# Patient Record
Sex: MALE | Race: WHITE | NOT HISPANIC OR LATINO | URBAN - NONMETROPOLITAN AREA
[De-identification: names, ages, dates, MRNs, and addresses within clinical notes are randomized per-mention and may not be internally consistent; named-entity substitution may affect disease eponyms.]

---

## 2018-12-04 NOTE — PATIENT DISCUSSION
Monitor for changes. Advised patient to call our office with any decreased vision or any increase in flashes and/or floaters.

## 2018-12-04 NOTE — PATIENT DISCUSSION
Monitor for changes. Advised patient of condition of cataract OS and discussed symptoms of cataract worsening and becoming more bothersome.  Pt to call if vision changes or becomes more bothered by visual symptoms before next appt.

## 2019-05-29 ENCOUNTER — IMPORTED ENCOUNTER (OUTPATIENT)
Dept: URBAN - NONMETROPOLITAN AREA CLINIC 1 | Facility: CLINIC | Age: 69
End: 2019-05-29

## 2019-05-29 PROBLEM — H52.4: Noted: 2019-05-29

## 2019-05-29 PROBLEM — E11.9: Noted: 2019-05-29

## 2019-05-29 PROBLEM — H31.092: Noted: 2019-05-29

## 2019-05-29 PROBLEM — H25.813: Noted: 2019-05-29

## 2019-05-29 PROCEDURE — 92015 DETERMINE REFRACTIVE STATE: CPT

## 2019-05-29 PROCEDURE — 92014 COMPRE OPH EXAM EST PT 1/>: CPT

## 2019-05-29 NOTE — PATIENT DISCUSSION
Myopia / Astigmatism / Presbyopia OU- Discussed diagnosis in detail with patient - Recommend holding off on glasses Rx at this time due to cataracts progressing/ having cataract eval with Dr. Lashae Gilliam- Continue to monitor- RTC 1 year complete  NIDDM (2004)- Discussed diagnosis in detail with patient- Stressed importance of good blood sugar control- Recommend no soda’s- History of diabetic retinopathy in the past but no BDR seen on todays dilated exam.- Patient reports last A1C was 6.5 and does not check blood sugar daily- Letter to Dr. Jamal Fischer in [address] to [address] [facility]- Discussed diagnosis in detail with patient- Discussed signs and symptoms of progression- Discussed UV protection- Recommend cataract eval with Dr. Lashae Gilliam patient agrees with plan but would like to wait on the evaluation until after August - Continue to monitor CR Scar OS- Discussed diagnosis in detail with patient- No changes at this time- Continue to monitor PRN; Dr's Notes: PA is Alessandra Fuentes in [place]  at Dr. Cherie Webster office MR  5/29/19DFE  5/29/19Optos  5/1/17

## 2019-07-31 ENCOUNTER — IMPORTED ENCOUNTER (OUTPATIENT)
Dept: URBAN - NONMETROPOLITAN AREA CLINIC 1 | Facility: CLINIC | Age: 69
End: 2019-07-31

## 2019-07-31 PROCEDURE — 92014 COMPRE OPH EXAM EST PT 1/>: CPT

## 2019-07-31 NOTE — PATIENT DISCUSSION
CR Scar OS- Discussed diagnosis in detail with patient- No changes at this time- Continue to monitor PRN DM s DR-Stressed the importance of keeping blood sugars under control blood pressure under control and weight normalization and regular visits with PCP. -Explained the possible effects of poorly controlled diabetes and the damage that diabetes can cause to ocular health. -Patient to check HgbA1C.-Pt instructed to contact our office with any vision changes. Cataract(s)-Visually significant cataract OU .-Cataract(s) causing symptomatic impairment of visual function not correctable with a tolerable change in glasses or contact lenses lighting or non-operative means resulting in specific activity limitations and/or participation restrictions including but not limited to reading viewing television driving or meeting vocational or recreational needs. -Expectation is clearer vision and functional improvement in symptoms as well as reduced glare disability after cataract removal.-Order IOLMaster and OPD today. -Recommend Standard/Traditional based on today's OPD testing and lifestyle questionnaire.-All questions were answered regarding surgery including pre and post-op medications appointments activity restrictions and anesthetic usage.-The risks benefits and alternatives and special risk factors for the patient were discussed in detail including but not limited to: bleeding infection retinal detachment vitreous loss problems with the implant and possible need for additional surgery.-Although rare the possibility of complete vision loss was discussed.-The possible need for glasses post-operatively was discussed.-Order medical clearance exam based on history of diabetes-Patient elects to proceed with cataract surgery OD .  Will schedule at patient's convenience and re-evaluate OS  in the future.; Dr's Notes: JOSE CARLOS Sanchez in [place]  at Dr. Theresa Taylor office MR  5/29/19DFE  5/29/19Optos  5/1/17

## 2019-08-03 PROBLEM — E11.9: Noted: 2019-08-03

## 2019-08-03 PROBLEM — H31.092: Noted: 2019-05-29

## 2019-08-03 PROBLEM — H52.4: Noted: 2019-08-03

## 2019-08-03 PROBLEM — H25.813: Noted: 2019-08-03

## 2019-09-10 ENCOUNTER — IMPORTED ENCOUNTER (OUTPATIENT)
Dept: URBAN - NONMETROPOLITAN AREA CLINIC 1 | Facility: CLINIC | Age: 69
End: 2019-09-10

## 2019-09-10 PROBLEM — Z01.818: Noted: 2019-09-10

## 2019-09-10 PROBLEM — I10: Noted: 2019-09-10

## 2019-09-10 PROBLEM — E78.5: Noted: 2019-09-10

## 2019-09-10 PROBLEM — E11.9: Noted: 2019-05-29

## 2019-09-10 NOTE — PATIENT DISCUSSION
No outstanding medical concerns and patient has been cleared for surgery; Dr's Notes: PA is Fredis Benoit in [place]  at Dr. Briana Lion <br />[facility] office <br />MR  5/29/19<br />DFE  5/29/19<br />Optos  5/1/17<br />

## 2019-09-17 ENCOUNTER — IMPORTED ENCOUNTER (OUTPATIENT)
Dept: URBAN - NONMETROPOLITAN AREA CLINIC 1 | Facility: CLINIC | Age: 69
End: 2019-09-17

## 2019-09-17 PROCEDURE — 99024 POSTOP FOLLOW-UP VISIT: CPT

## 2019-09-17 PROCEDURE — 66984 XCAPSL CTRC RMVL W/O ECP: CPT

## 2019-09-17 PROCEDURE — 92136 OPHTHALMIC BIOMETRY: CPT

## 2019-09-17 NOTE — PATIENT DISCUSSION
Cataract(s)-Visually significant cataract OS . -Cataract(s) causing symptomatic impairment of visual function not correctable with a tolerable change in glasses or contact lenses lighting or non-operative means resulting in specific activity limitations and/or participation restrictions including but not limited to reading viewing television driving or meeting vocational or recreational needs. -Expectation is clearer vision and functional improvement in symptoms as well as reduced glare disability after cataract removal.-Recommend Standard/Traditional based on previous OPD testing and lifestyle questionnaire.-All questions were answered regarding surgery including pre and post-op medications appointments activity restrictions and anesthetic usage.-The risks benefits and alternatives and special risk factors for the patient were discussed in detail including but not limited to: bleeding infection retinal detachment vitreous loss problems with the implant and possible need for additional surgery.-Although rare the possibility of complete vision loss was discussed.-The need for glasses post-operatively was discussed.-Patient elects to proceed with cataract surgery OS . Will schedule at patient's convenience. s/p PCIOL-Pt doing well s/p PCIOL. -Continue post-op gtts according to instruction sheet and sleep with eye shield over eye for 7 nights.-Avoid bending at the waist lifting anything over 5lbs and dirty or danilo environments.; 's Notes: JOSE CARLOS Whatley in [place]  at Dr. Idalia Rodriguez office MR  5/29/19DFE  5/29/19Optos  5/1/17

## 2019-09-18 PROBLEM — E11.9: Noted: 2019-11-12

## 2019-09-18 PROBLEM — Z98.41: Noted: 2019-09-18

## 2019-09-18 PROBLEM — E11.9: Noted: 2019-09-18

## 2019-09-18 PROBLEM — Z98.42: Noted: 2019-10-09

## 2019-09-18 PROBLEM — H25.12: Noted: 2019-09-18

## 2019-09-18 PROBLEM — H26.493: Noted: 2019-11-12

## 2019-09-24 ENCOUNTER — IMPORTED ENCOUNTER (OUTPATIENT)
Dept: URBAN - NONMETROPOLITAN AREA CLINIC 1 | Facility: CLINIC | Age: 69
End: 2019-09-24

## 2019-09-24 PROCEDURE — 99024 POSTOP FOLLOW-UP VISIT: CPT

## 2019-09-24 NOTE — PATIENT DISCUSSION
1 Week POV CE OD 9/17/19 Standard-  The pt has undergone successful cataract extraction with Intraocular lens implantation in the right eye.-  PO examination is normal and visual acuity has improved. -  The pt has been instructed to call the office immediately if there is increased redness pain or vision loss. -  Ocular meds plan discussed and patient received printed take home instructions.- RTC 3 weeks; Dr's Notes: PA is Issa Means in [place]  at Dr. Duque Born office MR  5/29/19DFE  5/29/19Optos  5/1/17

## 2019-10-09 ENCOUNTER — IMPORTED ENCOUNTER (OUTPATIENT)
Dept: URBAN - NONMETROPOLITAN AREA CLINIC 1 | Facility: CLINIC | Age: 69
End: 2019-10-09

## 2019-10-09 PROCEDURE — 99024 POSTOP FOLLOW-UP VISIT: CPT

## 2019-10-09 NOTE — PATIENT DISCUSSION
1 day POV CE OS - The pt has undergone successful cataract extraction with Intraocular lens implantation in the left eye.- PO examination is normal and visual acuity has improved. - Instructed patient not to rub the eye and don't go swimming. - Pt should return in 1 week for follow up.  - Ocular meds plan discussed and patient received printed take home instructions.; Dr's Notes: PA is Joe Baez in [place]  at Dr. Nicole Clay office MR  5/29/19DFE  5/29/19Optos  5/1/17

## 2019-10-15 ENCOUNTER — IMPORTED ENCOUNTER (OUTPATIENT)
Dept: URBAN - NONMETROPOLITAN AREA CLINIC 1 | Facility: CLINIC | Age: 69
End: 2019-10-15

## 2019-10-15 PROCEDURE — 99024 POSTOP FOLLOW-UP VISIT: CPT

## 2019-10-15 NOTE — PATIENT DISCUSSION
1 Week POV CE OS 10/8/19 CE OD 9/17/19 (Standard IOL OU) - Discussed diagnosis in detail with patient- Patient is stable and doing well- Wound intact- Continue all post op drops as directed- Continue to monitor- RTC 3 weeks POV MR; 's Notes: PA is Cleo Espana in [place]  at Dr. Kathie Byers office MR  5/29/19DFE  5/29/19Optos  5/1/17

## 2019-11-12 ENCOUNTER — IMPORTED ENCOUNTER (OUTPATIENT)
Dept: URBAN - NONMETROPOLITAN AREA CLINIC 1 | Facility: CLINIC | Age: 69
End: 2019-11-12

## 2019-11-12 PROCEDURE — 92015 DETERMINE REFRACTIVE STATE: CPT

## 2019-11-12 PROCEDURE — 99024 POSTOP FOLLOW-UP VISIT: CPT

## 2019-11-12 NOTE — PATIENT DISCUSSION
3 Week POV CE OS 10/8/19 CE OD 9/17/19 (Standard IOL OU) - Discussed diagnosis in detail with patient- Patient is stable and doing well- Wound intact- MR done today and new glasses Rx given - PCO OU noted but stable and no treatment needed at this time - Continue to monitor- RTC 3 months Pseudophakia OU; Dr's Notes: PA is Puma Castro in [place]  at Dr. Moira Coleman office MR  5/29/19DFE  5/29/19Optos  5/1/17

## 2019-12-11 NOTE — PATIENT DISCUSSION
RTC in 1 year for [facility] OCT, sooner if problems or changes occur.

## 2020-02-27 ENCOUNTER — IMPORTED ENCOUNTER (OUTPATIENT)
Dept: URBAN - NONMETROPOLITAN AREA CLINIC 1 | Facility: CLINIC | Age: 70
End: 2020-02-27

## 2020-02-27 PROBLEM — H04.123: Noted: 2020-02-27

## 2020-02-27 PROBLEM — S05.01XA: Noted: 2020-02-27

## 2020-02-27 PROBLEM — H26.493: Noted: 2020-02-27

## 2020-02-27 PROBLEM — E11.9: Noted: 2020-02-27

## 2020-02-27 PROBLEM — Z96.1: Noted: 2020-02-27

## 2020-02-27 PROCEDURE — 92014 COMPRE OPH EXAM EST PT 1/>: CPT

## 2020-02-27 NOTE — PATIENT DISCUSSION
Pseudophakia OU w/ PCO -Discussed in detail with patient-Mild PCO noted OU on today exam no glare or VA complaint at this time. No treatment needed. -Continue to monitor NEETA - Discussed diagnosis in detail with patient- Discussed signs and symptoms of progression- Recommend patient drinking plenty of water and starting Omega 3’s - Recommend Refresh or Systane  throughout the day samples given today. - Consider Restasis or plugs in the future if no improvement- Continue to monitorAbrasion OD -Discussed in detail with patient-Very small faint abrasion noted OD centrally today d/t rubbing from dryness.  Stressed to not rub eyes-Start AT's samples given today and stressed to use OD today-Continue to monitor; Dr's Notes: PA is Collette Swan in [place]  at Dr. Peyton Villegas office MR  5/29/19DFE  2/27/20 Optos  5/1/17

## 2020-08-31 ENCOUNTER — IMPORTED ENCOUNTER (OUTPATIENT)
Dept: URBAN - NONMETROPOLITAN AREA CLINIC 1 | Facility: CLINIC | Age: 70
End: 2020-08-31

## 2020-08-31 PROBLEM — H26.493: Noted: 2020-08-31

## 2020-08-31 PROBLEM — Z96.1: Noted: 2020-08-31

## 2020-08-31 PROBLEM — S05.01XA: Noted: 2020-08-31

## 2020-08-31 PROBLEM — H04.123: Noted: 2020-08-31

## 2020-08-31 PROBLEM — H02.052: Noted: 2020-08-31

## 2020-08-31 PROBLEM — H10.11: Noted: 2020-08-31

## 2020-08-31 PROBLEM — E11.9: Noted: 2020-08-31

## 2020-08-31 PROCEDURE — 99213 OFFICE O/P EST LOW 20 MIN: CPT

## 2020-08-31 NOTE — PATIENT DISCUSSION
Conjunctivitis /  trichiasis- Discussed diagnosis in detail with patient - Injection and Chemosis noted 360 today- Patient had a eye lash in OD removed with a Qtip with out complications with verbal consent from patient - Start Durzeol BID OD x 3-4 days samples given today- Start cool compresses through out the day - Continue to monitot ------------------------------previous notes--------------------------Pseudophakia OU w/ PCO -Discussed in detail with patient-Mild PCO noted OU on today exam no glare or VA complaint at this time. No treatment needed. -Continue to monitor NEETA - Discussed diagnosis in detail with patient- Discussed signs and symptoms of progression- Recommend patient drinking plenty of water and starting Omega 3’s - Recommend Refresh or Systane  throughout the day samples given today. - Consider Restasis or plugs in the future if no improvement- Continue to monitorAbrasion OD -Discussed in detail with patient-Very small faint abrasion noted OD centrally today d/t rubbing from dryness.  Stressed to not rub eyes-Start AT's samples given today and stressed to use OD today-Continue to monitor; 's Notes: PA is Александр Shaw in [place]  at Dr. Brad Abreu office MR  5/29/19DFE  2/27/20 Optos  5/1/17

## 2020-12-03 ENCOUNTER — IMPORTED ENCOUNTER (OUTPATIENT)
Dept: URBAN - NONMETROPOLITAN AREA CLINIC 1 | Facility: CLINIC | Age: 70
End: 2020-12-03

## 2020-12-03 PROBLEM — Z96.1: Noted: 2020-12-03

## 2020-12-03 PROBLEM — H26.493: Noted: 2020-12-03

## 2020-12-03 PROBLEM — H04.123: Noted: 2020-12-03

## 2020-12-03 PROBLEM — E11.9: Noted: 2020-12-03

## 2020-12-03 PROCEDURE — 92014 COMPRE OPH EXAM EST PT 1/>: CPT

## 2020-12-03 PROCEDURE — 92015 DETERMINE REFRACTIVE STATE: CPT

## 2020-12-03 NOTE — PATIENT DISCUSSION
Pseudophakia OU w/ PCO - Discussed in detail with patient- PCO noted OU today but no treatment needed at this time - Continue to monitor NEETA OU - Discussed diagnosis in detail with patient- Discussed signs and symptoms of progression- Recommend patient drinking plenty of water and starting Omega 3’s - Recommend Refresh or Systane  throughout the day samples given today.  - Continue to monitorNIDDM- Discussed diagnosis in detail with patient- Stressed importance of good blood sugar control- Recommend no soda’s- Continue to monitorPresbyopia OU - Discussed diagnosis in detail with patient- New glasses RX given today- Continue to monitor- RTC 1 year complete; 's Notes: JOSE CARLOS Davidson in [place]  at Dr. Enrique Hodgkin office MR  5/29/19DFE  2/27/20 Optos  5/1/17

## 2021-05-17 NOTE — PATIENT DISCUSSION
The patient feels that the cataract is significantly impacting daily activities and has elected cataract surgery. The risks, benefits, and alternatives to surgery were discussed.

## 2021-05-17 NOTE — PATIENT DISCUSSION
Recommend for patient to hold off on cataract surgery at this point. Patient given an rx for distance glasses to help maximize distance vision with both eyes working together at distance.

## 2021-12-07 ENCOUNTER — IMPORTED ENCOUNTER (OUTPATIENT)
Dept: URBAN - NONMETROPOLITAN AREA CLINIC 1 | Facility: CLINIC | Age: 71
End: 2021-12-07

## 2021-12-07 PROBLEM — H52.4: Noted: 2021-12-07

## 2021-12-07 PROBLEM — H04.123: Noted: 2021-12-07

## 2021-12-07 PROBLEM — H26.493: Noted: 2021-12-07

## 2021-12-07 PROBLEM — Z96.1: Noted: 2021-12-07

## 2021-12-07 PROBLEM — E11.9: Noted: 2021-12-07

## 2021-12-07 PROCEDURE — 92014 COMPRE OPH EXAM EST PT 1/>: CPT

## 2021-12-07 PROCEDURE — 92015 DETERMINE REFRACTIVE STATE: CPT

## 2021-12-07 NOTE — PATIENT DISCUSSION
Pseudophakia OU w/ PCO - Discussed in detail with patient- PCO noted OU today but no treatment needed at this time - Continue to monitor - BAT at next visitDES OU - Discussed diagnosis in detail with patient- Discussed signs and symptoms of progression- Recommend patient drinking plenty of water and starting Omega 3’s - Recommend Refresh or Systane  throughout the day samples given today.  - Continue to monitorNIDDM- Discussed diagnosis in detail with patient- Stressed importance of good blood sugar control- Recommend no soda’s- Patient states last A1c was under 7- No diabetic retinoapth yseen on today's exam- Notesto Gilson Tabares - Continue to monitorPresbyopia OU - Discussed diagnosis in detail with patient- New glasses RX given today do not recommend updating at this time - Continue to monitor- RTC 1 year complete; 's Notes: JOSE CARLOS Li Handing in [place]  at Dr. [address] office MR  5/29/19DFE  2/27/20 Optos  5/1/17

## 2022-04-10 ASSESSMENT — TONOMETRY
OS_IOP_MMHG: 18
OD_IOP_MMHG: 15
OS_IOP_MMHG: 14
OD_IOP_MMHG: 14
OD_IOP_MMHG: 14
OS_IOP_MMHG: 14
OD_IOP_MMHG: 14
OS_IOP_MMHG: 14
OD_IOP_MMHG: 14
OS_IOP_MMHG: 15
OD_IOP_MMHG: 17
OD_IOP_MMHG: 14
OS_IOP_MMHG: 13
OS_IOP_MMHG: 15
OD_IOP_MMHG: 18
OS_IOP_MMHG: 15
OD_IOP_MMHG: 13
OD_IOP_MMHG: 14
OD_IOP_MMHG: 18
OS_IOP_MMHG: 14

## 2022-04-10 ASSESSMENT — VISUAL ACUITY
OD_PAM: 20/20
OS_GLARE: 20/30
OD_SC: 20/20-
OS_AM: 20/20
OD_GLARE: 20/40
OS_SC: 20/30
OD_CC: 20/22-
OS_SC: 20/20-2
OD_GLARE: 20/40
OS_CC: 20/20-
OS_CC: 20/63
OD_SC: 20/40+2
OS_SC: 20/20
OS_CC: 20/100
OS_SC: 20/20
OS_GLARE: 20/30
OD_CC: 20/20
OD_GLARE: 20/80
OD_PH: 20/20-
OS_CC: 20/29-
OD_CC: 20/20-
OD_SC: 20/25
OD_SC: 20/29
OS_SC: 20/20-
OS_CC: 20/25-2
OS_AM: 20/20
OD_CC: 20/50-
OD_PH: 20/30
OS_PH: 20/50-1
OD_SC: 20/20
OD_CC: 20/20
OS_GLARE: 20/30
OU_CC: 20/20
OD_SC: 20/50
OS_SC: 20/20
OS_GLARE: 20/80+

## 2022-04-10 ASSESSMENT — KERATOMETRY
OS_K2POWER_DIOPTERS: -0.75
OS_K1POWER_DIOPTERS: -0.25
OD_K1POWER_DIOPTERS: -0.50
OS_AXISANGLE_DEGREES: 110

## 2022-05-19 NOTE — PATIENT DISCUSSION
Artificial Tears: One drop to both eyes 3-4 times daily. Pt using Systane lubricating eye drops occasionally. Adjust as needed.

## 2023-05-22 ENCOUNTER — CONTACT LENSES/GLASSES VISIT (OUTPATIENT)
Dept: URBAN - NONMETROPOLITAN AREA CLINIC 1 | Facility: CLINIC | Age: 73
End: 2023-05-22

## 2023-05-22 DIAGNOSIS — H52.4: ICD-10-CM

## 2023-05-22 PROCEDURE — 92015 DETERMINE REFRACTIVE STATE: CPT

## 2023-05-22 ASSESSMENT — VISUAL ACUITY
OD_CC: 20/25-2
OS_CC: 20/20
OU_CC: 20/20

## 2024-02-28 ENCOUNTER — COMPREHENSIVE EXAM (OUTPATIENT)
Dept: URBAN - NONMETROPOLITAN AREA CLINIC 1 | Facility: CLINIC | Age: 74
End: 2024-02-28

## 2024-02-28 DIAGNOSIS — H52.4: ICD-10-CM

## 2024-02-28 PROCEDURE — 92014 COMPRE OPH EXAM EST PT 1/>: CPT

## 2024-02-28 PROCEDURE — 92015 DETERMINE REFRACTIVE STATE: CPT

## 2024-02-28 ASSESSMENT — VISUAL ACUITY
OU_CC: 20/20-
OD_CC: 20/20-
OS_CC: 20/20

## 2024-02-28 ASSESSMENT — TONOMETRY
OS_IOP_MMHG: 16
OD_IOP_MMHG: 17

## 2025-03-03 ENCOUNTER — COMPREHENSIVE EXAM (OUTPATIENT)
Age: 75
End: 2025-03-03

## 2025-03-03 DIAGNOSIS — H52.4: ICD-10-CM

## 2025-03-03 PROCEDURE — 92014 COMPRE OPH EXAM EST PT 1/>: CPT

## 2025-03-03 PROCEDURE — 92015 DETERMINE REFRACTIVE STATE: CPT
